# Patient Record
Sex: FEMALE | URBAN - METROPOLITAN AREA
[De-identification: names, ages, dates, MRNs, and addresses within clinical notes are randomized per-mention and may not be internally consistent; named-entity substitution may affect disease eponyms.]

---

## 2025-01-02 ENCOUNTER — APPOINTMENT (OUTPATIENT)
Dept: PRIMARY CARE | Facility: CLINIC | Age: 40
End: 2025-01-02
Payer: COMMERCIAL

## 2025-01-02 VITALS
OXYGEN SATURATION: 99 % | SYSTOLIC BLOOD PRESSURE: 90 MMHG | WEIGHT: 134 LBS | DIASTOLIC BLOOD PRESSURE: 64 MMHG | HEIGHT: 64 IN | BODY MASS INDEX: 22.88 KG/M2 | TEMPERATURE: 98 F | HEART RATE: 65 BPM

## 2025-01-02 DIAGNOSIS — E78.2 MIXED HYPERLIPIDEMIA: ICD-10-CM

## 2025-01-02 DIAGNOSIS — E03.9 ACQUIRED HYPOTHYROIDISM: Primary | ICD-10-CM

## 2025-01-02 PROCEDURE — 3008F BODY MASS INDEX DOCD: CPT | Performed by: FAMILY MEDICINE

## 2025-01-02 PROCEDURE — 99385 PREV VISIT NEW AGE 18-39: CPT | Performed by: FAMILY MEDICINE

## 2025-01-02 RX ORDER — LEVOTHYROXINE SODIUM 25 UG/1
25 TABLET ORAL
COMMUNITY
Start: 2024-10-02 | End: 2025-01-02 | Stop reason: SDUPTHER

## 2025-01-02 RX ORDER — LEVOTHYROXINE SODIUM 25 UG/1
25 TABLET ORAL
Qty: 90 TABLET | Refills: 3 | Status: SHIPPED | OUTPATIENT
Start: 2025-01-02 | End: 2026-01-02

## 2025-01-02 RX ORDER — LORATADINE 10 MG/1
10 TABLET ORAL AS NEEDED
COMMUNITY

## 2025-01-02 ASSESSMENT — ENCOUNTER SYMPTOMS
CHILLS: 0
SHORTNESS OF BREATH: 0
FEVER: 0
DIZZINESS: 0
ACTIVITY CHANGE: 0
SLEEP DISTURBANCE: 0
ABDOMINAL PAIN: 0
APPETITE CHANGE: 0
LIGHT-HEADEDNESS: 0

## 2025-01-02 ASSESSMENT — PATIENT HEALTH QUESTIONNAIRE - PHQ9
SUM OF ALL RESPONSES TO PHQ9 QUESTIONS 1 AND 2: 0
1. LITTLE INTEREST OR PLEASURE IN DOING THINGS: NOT AT ALL
2. FEELING DOWN, DEPRESSED OR HOPELESS: NOT AT ALL

## 2025-01-02 NOTE — ASSESSMENT & PLAN NOTE
Stable   No changes to meds  Due for recheck today     Orders:    TSH with reflex to Free T4 if abnormal; Future    TSH with reflex to Free T4 if abnormal; Future    levothyroxine (Synthroid, Levoxyl) 25 mcg tablet; Take 1 tablet (25 mcg) by mouth once daily in the morning. Take before meals. take on empty stomach

## 2025-01-02 NOTE — ASSESSMENT & PLAN NOTE
Orders:    CBC and Auto Differential; Future    Comprehensive Metabolic Panel; Future    Lipid Panel; Future    CBC and Auto Differential; Future    Comprehensive Metabolic Panel; Future    Lipid Panel; Future

## 2025-01-02 NOTE — PROGRESS NOTES
"Subjective   Patient ID: Jaelyn Chin is a 39 y.o. female who presents for New Patient Visit and Annual Exam.    HPI   Here for annual wellness exam. Last wellness last yr      New concerns:no  Feels: well    Changes  to health/medications since last visit Yes   Other providers seen since last visit none   Periods: regular  Contraception: no pts  had vasectomy        Health screenings:  Pap smear: [facility]                                   Mammogram not applicable                                  Self-breast Exam No                                   Colon screening no                                  Dexa not applicable                                  Hep C screening No                                   HIV screening no                                  STI screeningnot applicable            Review of Systems   Constitutional:  Negative for activity change, appetite change, chills and fever.   Eyes:  Negative for visual disturbance.   Respiratory:  Negative for shortness of breath.    Cardiovascular:  Negative for chest pain.   Gastrointestinal:  Negative for abdominal pain.   Skin:  Negative for rash.   Neurological:  Negative for dizziness and light-headedness.   Psychiatric/Behavioral:  Negative for sleep disturbance.        Objective   BP 90/64   Pulse 65   Temp 36.7 °C (98 °F)   Ht 1.613 m (5' 3.5\")   Wt 60.8 kg (134 lb)   SpO2 99%   BMI 23.36 kg/m²     Physical Exam  Constitutional:       Appearance: Normal appearance.   Eyes:      Pupils: Pupils are equal, round, and reactive to light.   Cardiovascular:      Rate and Rhythm: Normal rate and regular rhythm.   Pulmonary:      Effort: Pulmonary effort is normal.      Breath sounds: Normal breath sounds.   Abdominal:      General: Abdomen is flat. Bowel sounds are normal.      Palpations: Abdomen is soft.   Musculoskeletal:         General: Normal range of motion.   Skin:     General: Skin is warm.   Neurological:      General: No focal " deficit present.      Mental Status: She is alert.   Psychiatric:         Mood and Affect: Mood normal.       Assessment/Plan   Assessment & Plan  Acquired hypothyroidism  Stable   No changes to meds  Due for recheck today     Orders:    TSH with reflex to Free T4 if abnormal; Future    TSH with reflex to Free T4 if abnormal; Future    levothyroxine (Synthroid, Levoxyl) 25 mcg tablet; Take 1 tablet (25 mcg) by mouth once daily in the morning. Take before meals. take on empty stomach    Mixed hyperlipidemia    Orders:    CBC and Auto Differential; Future    Comprehensive Metabolic Panel; Future    Lipid Panel; Future    CBC and Auto Differential; Future    Comprehensive Metabolic Panel; Future    Lipid Panel; Future

## 2026-01-02 ENCOUNTER — APPOINTMENT (OUTPATIENT)
Dept: PRIMARY CARE | Facility: CLINIC | Age: 41
End: 2026-01-02
Payer: COMMERCIAL